# Patient Record
Sex: MALE | Race: WHITE | NOT HISPANIC OR LATINO | URBAN - METROPOLITAN AREA
[De-identification: names, ages, dates, MRNs, and addresses within clinical notes are randomized per-mention and may not be internally consistent; named-entity substitution may affect disease eponyms.]

---

## 2021-11-22 ENCOUNTER — OFFICE VISIT (OUTPATIENT)
Dept: URBAN - METROPOLITAN AREA CLINIC 121 | Facility: CLINIC | Age: 51
End: 2021-11-22

## 2022-01-06 ENCOUNTER — OFFICE VISIT (OUTPATIENT)
Dept: URBAN - METROPOLITAN AREA CLINIC 63 | Facility: CLINIC | Age: 52
End: 2022-01-06

## 2022-01-24 ENCOUNTER — OFFICE VISIT (OUTPATIENT)
Dept: URBAN - METROPOLITAN AREA SURGERY CENTER 4 | Facility: SURGERY CENTER | Age: 52
End: 2022-01-24

## 2022-01-27 LAB — PATHOLOGY (INDENTED REPORT): (no result)

## 2022-02-07 ENCOUNTER — OFFICE VISIT (OUTPATIENT)
Dept: URBAN - METROPOLITAN AREA CLINIC 63 | Facility: CLINIC | Age: 52
End: 2022-02-07

## 2022-04-28 ENCOUNTER — OFFICE VISIT (OUTPATIENT)
Dept: URBAN - METROPOLITAN AREA TELEHEALTH 2 | Facility: TELEHEALTH | Age: 52
End: 2022-04-28

## 2022-07-09 ENCOUNTER — TELEPHONE ENCOUNTER (OUTPATIENT)
Dept: URBAN - METROPOLITAN AREA CLINIC 121 | Facility: CLINIC | Age: 52
End: 2022-07-09

## 2022-07-10 ENCOUNTER — TELEPHONE ENCOUNTER (OUTPATIENT)
Dept: URBAN - METROPOLITAN AREA CLINIC 121 | Facility: CLINIC | Age: 52
End: 2022-07-10

## 2022-07-10 RX ORDER — OMEPRAZOLE 40 MG/1
ONCE A DAY CAPSULE, DELAYED RELEASE ORAL ONCE A DAY
Refills: 1 | Status: ACTIVE | COMMUNITY
Start: 2022-01-06

## 2022-07-10 RX ORDER — AMOXICILLIN 500 MG/1
2 TWICE A DAY CAPSULE ORAL
Refills: 0 | Status: ACTIVE | COMMUNITY
Start: 2022-02-07

## 2022-07-10 RX ORDER — CLARITHROMYCIN 500 MG/1
TWICE A DAY TABLET ORAL TWICE A DAY
Refills: 0 | Status: ACTIVE | COMMUNITY
Start: 2022-02-07

## 2022-12-21 ENCOUNTER — DASHBOARD ENCOUNTERS (OUTPATIENT)
Age: 52
End: 2022-12-21

## 2022-12-21 ENCOUNTER — OFFICE VISIT (OUTPATIENT)
Dept: URBAN - METROPOLITAN AREA CLINIC 63 | Facility: CLINIC | Age: 52
End: 2022-12-21
Payer: OTHER GOVERNMENT

## 2022-12-21 VITALS
HEART RATE: 75 BPM | HEIGHT: 68 IN | WEIGHT: 192 LBS | TEMPERATURE: 97.2 F | BODY MASS INDEX: 29.1 KG/M2 | SYSTOLIC BLOOD PRESSURE: 116 MMHG | DIASTOLIC BLOOD PRESSURE: 76 MMHG | OXYGEN SATURATION: 98 %

## 2022-12-21 DIAGNOSIS — A04.8 H. PYLORI INFECTION: ICD-10-CM

## 2022-12-21 DIAGNOSIS — R13.19 OTHER DYSPHAGIA: ICD-10-CM

## 2022-12-21 PROCEDURE — 99213 OFFICE O/P EST LOW 20 MIN: CPT | Performed by: NURSE PRACTITIONER

## 2022-12-21 RX ORDER — OMEPRAZOLE 40 MG/1
1 CAPSULE 30 MINUTES BEFORE MORNING MEAL CAPSULE, DELAYED RELEASE ORAL ONCE A DAY
Qty: 90 | OUTPATIENT
Start: 2022-12-21

## 2022-12-21 NOTE — HPI-TODAY'S VISIT:
52-year-old male here for evaluation for dysphagia. History of H. pylori gastritis treated with Amoxil triple therapy.  Did not complete stool studies confirm eradication. History of patient having bowel movements every 3 to 5 days.  Takes fiber and stool softeners.  No blood in stool or stools or tarry stool.

## 2022-12-21 NOTE — HPI-PREVIOUS PROCEDURES
Colonoscopy January 2022, good prep, performed without difficulty internal hemorrhoids, polyps.  Recall 10 years (2032)  EGD January 2022, small hiatal hernia, no endoscopic abnormality to explain dysphagia.  Dilated with 56 Swedish moore dilator.  Unremarkable biopsies.  Inflammation in antrum and stomach.  biopsies Moderate chronic gastritis positive for H. pylori.  duodenal bulb first and second portion of the duodenum were normal.

## 2022-12-26 LAB — HELICOBACTER PYLORI AG, EIA, STOOL: (no result)
